# Patient Record
Sex: FEMALE | Race: WHITE | Employment: OTHER | URBAN - METROPOLITAN AREA
[De-identification: names, ages, dates, MRNs, and addresses within clinical notes are randomized per-mention and may not be internally consistent; named-entity substitution may affect disease eponyms.]

---

## 2024-10-07 ENCOUNTER — APPOINTMENT (OUTPATIENT)
Dept: GENERAL RADIOLOGY | Age: 59
End: 2024-10-07
Payer: MEDICARE

## 2024-10-07 ENCOUNTER — HOSPITAL ENCOUNTER (EMERGENCY)
Age: 59
Discharge: HOME | End: 2024-10-07
Attending: EMERGENCY MEDICINE
Payer: MEDICARE

## 2024-10-07 ENCOUNTER — APPOINTMENT (OUTPATIENT)
Dept: CT IMAGING | Age: 59
End: 2024-10-07
Payer: MEDICARE

## 2024-10-07 VITALS
HEART RATE: 76 BPM | DIASTOLIC BLOOD PRESSURE: 109 MMHG | SYSTOLIC BLOOD PRESSURE: 125 MMHG | RESPIRATION RATE: 20 BRPM | OXYGEN SATURATION: 97 % | TEMPERATURE: 97.9 F

## 2024-10-07 DIAGNOSIS — R11.2 NAUSEA AND VOMITING, UNSPECIFIED VOMITING TYPE: Primary | ICD-10-CM

## 2024-10-07 LAB
ANION GAP SERPL CALCULATED.3IONS-SCNC: 9 MMOL/L (ref 9–17)
BASOPHILS # BLD: 0 K/UL (ref 0–0.2)
BASOPHILS NFR BLD: 0 % (ref 0–2)
BUN SERPL-MCNC: 19 MG/DL (ref 6–20)
CALCIUM SERPL-MCNC: 11.2 MG/DL (ref 8.6–10.4)
CHLORIDE SERPL-SCNC: 104 MMOL/L (ref 98–107)
CO2 SERPL-SCNC: 31 MMOL/L (ref 20–31)
CREAT SERPL-MCNC: 0.8 MG/DL (ref 0.5–0.9)
EOSINOPHIL # BLD: 0.2 K/UL (ref 0–0.4)
EOSINOPHILS RELATIVE PERCENT: 2 % (ref 1–4)
ERYTHROCYTE [DISTWIDTH] IN BLOOD BY AUTOMATED COUNT: 14.2 % (ref 12.5–15.4)
GFR, ESTIMATED: 85 ML/MIN/1.73M2
GLUCOSE SERPL-MCNC: 130 MG/DL (ref 70–99)
HCT VFR BLD AUTO: 47 % (ref 36–46)
HGB BLD-MCNC: 15.3 G/DL (ref 12–16)
LACTATE BLDV-SCNC: 1.5 MMOL/L (ref 0.5–2.2)
LYMPHOCYTES NFR BLD: 1.7 K/UL (ref 1–4.8)
LYMPHOCYTES RELATIVE PERCENT: 14 % (ref 24–44)
MCH RBC QN AUTO: 30.7 PG (ref 26–34)
MCHC RBC AUTO-ENTMCNC: 32.6 G/DL (ref 31–37)
MCV RBC AUTO: 94.1 FL (ref 80–100)
MONOCYTES NFR BLD: 0.6 K/UL (ref 0.1–1.2)
MONOCYTES NFR BLD: 5 % (ref 2–11)
NEUTROPHILS NFR BLD: 79 % (ref 36–66)
NEUTS SEG NFR BLD: 9.9 K/UL (ref 1.8–7.7)
PLATELET # BLD AUTO: 247 K/UL (ref 140–450)
PMV BLD AUTO: 8.9 FL (ref 6–12)
POTASSIUM SERPL-SCNC: 4.6 MMOL/L (ref 3.7–5.3)
RBC # BLD AUTO: 4.99 M/UL (ref 4–5.2)
SODIUM SERPL-SCNC: 144 MMOL/L (ref 135–144)
WBC OTHER # BLD: 12.5 K/UL (ref 3.5–11)

## 2024-10-07 PROCEDURE — 71045 X-RAY EXAM CHEST 1 VIEW: CPT

## 2024-10-07 PROCEDURE — 74176 CT ABD & PELVIS W/O CONTRAST: CPT

## 2024-10-07 PROCEDURE — 80048 BASIC METABOLIC PNL TOTAL CA: CPT

## 2024-10-07 PROCEDURE — 99284 EMERGENCY DEPT VISIT MOD MDM: CPT

## 2024-10-07 PROCEDURE — 83605 ASSAY OF LACTIC ACID: CPT

## 2024-10-07 PROCEDURE — 85025 COMPLETE CBC W/AUTO DIFF WBC: CPT

## 2024-10-07 RX ORDER — CEPHALEXIN 500 MG/1
500 CAPSULE ORAL 2 TIMES DAILY
Qty: 14 CAPSULE | Refills: 0 | Status: SHIPPED | OUTPATIENT
Start: 2024-10-07 | End: 2024-10-14

## 2024-10-07 NOTE — ED PROVIDER NOTES
Residents, Physician Assistant/ Nurse Practitioner cases/documentation I have personally evaluated this patient and have completed at least one if not all key elements of the E/M (history, physical exam, and MDM). Additional findings are as noted.          (Please note that portions of this note were completed with a voice recognition program.  Efforts were made to edit the dictations but occasionally words are mis-transcribed.)    Salima Nicole DO  Attending Emergency Medicine Physician        Salima Nicole,   10/12/24 0731

## 2024-10-07 NOTE — ED PROVIDER NOTES
CURRENT MEDICATIONS       Previous Medications    ACETAMINOPHEN (TYLENOL) 160 MG/5ML LIQUID    Take 640 mg by mouth every 4 hours as needed for Fever    AMMONIUM LACTATE (AMLACTIN) 12 % CREAM    Apply topically 2 times daily .     BISACODYL (DULCOLAX) 10 MG SUPPOSITORY    Place 10 mg rectally every 72 hours as needed for Constipation     CALCIUM CITRATE (CALCITRATE PO)    Take 200 mg by mouth 2 times daily May crush    CARBAMAZEPINE (TEGRETOL) 200 MG TABLET    Take 200 mg by mouth 2 times daily May crush    CHLORHEXIDINE (PERIDEX) 0.12 % SOLUTION    Take 15 mLs by mouth 3 times daily POST MEALS      CLINDAMYCIN (CLINDAGEL) 1 % GEL    Apply 1 applicator topically daily as needed (FOREHEAD-ACNE) Apply topically 2 times daily.     DIAZEPAM (DIASTAT) 10 MG GEL    Place 10 mg rectally once as needed (may repeat in 4hrs prn)    DIAZEPAM (VALIUM) 5 MG TABLET    Take 5 mg by mouth daily as needed for Anxiety (, SEIZURE LASTING LONGER THEN 3 MINS OR 3 SEIZURES IN 1 HR)     HYDROCORTISONE VALERATE (WESTCORT EX)    Apply topically daily as needed (CHIN RASH)     LACTULOSE ENCEPHALOPATHY (GENERLAC) 10 GM/15ML SOLN SOLUTION    Take 10 g by mouth 2 times daily     LEVOTHYROXINE (SYNTHROID) 112 MCG TABLET    Take 112 mcg by mouth Daily May crush    LORATADINE (CLARITIN) 10 MG TABLET    Take 10 mg by mouth daily May crush    LORAZEPAM (ATIVAN) 1 MG TABLET    Take 1 mg by mouth daily as needed for Anxiety (1 hr prior to dental may repeat x1 prn)    MULTIPLE VITAMINS-MINERALS (CERTAVITE/ANTIOXIDANTS) LIQD    Take 5 mLs by mouth daily    MUPIROCIN (BACTROBAN) 2 % OINTMENT    Apply topically 2 times daily as needed IRRITATION  EXTERNAL EAR      NONFORMULARY    Take 15 mLs by mouth 2 times daily docu-liquid otc    NUTRITIONAL SUPPLEMENTS (BOOST BREEZE PO)    Take 4 oz by mouth 2 times daily With meds    OMEPRAZOLE (PRILOSEC) 20 MG CAPSULE    Take 20 mg by mouth daily    OXYGEN    Inhale 2 L into the lungs daily as needed

## 2024-10-07 NOTE — DISCHARGE INSTRUCTIONS
Keflex prophylactically with concern for a urinary tract infection today as evidenced by some inflammatory changes around the ureter on CAT scan.  We were unable to obtain a urine sample.  She does have a large kidney stone that will need follow-up with urology, Dr. Roberson.  Please follow-up in the next 1 to 2 days.  Return for vomiting, altered mentation, fevers, worsening symptoms, or any other concerns

## 2024-11-01 ENCOUNTER — TELEPHONE (OUTPATIENT)
Age: 59
End: 2024-11-01

## 2024-11-01 ENCOUNTER — HOSPITAL ENCOUNTER (OUTPATIENT)
Dept: ULTRASOUND IMAGING | Age: 59
Discharge: HOME | End: 2024-11-03
Attending: FAMILY MEDICINE
Payer: MEDICARE

## 2024-11-01 DIAGNOSIS — R19.09 GROIN MASS IN FEMALE: ICD-10-CM

## 2024-11-01 PROCEDURE — 76856 US EXAM PELVIC COMPLETE: CPT

## 2024-11-13 ENCOUNTER — OFFICE VISIT (OUTPATIENT)
Age: 59
End: 2024-11-13
Payer: MEDICARE

## 2024-11-13 VITALS — HEIGHT: 55 IN | BODY MASS INDEX: 18.28 KG/M2 | WEIGHT: 79 LBS

## 2024-11-13 DIAGNOSIS — N39.0 RECURRENT UTI: ICD-10-CM

## 2024-11-13 DIAGNOSIS — N20.0 STAGHORN CALCULUS: Primary | ICD-10-CM

## 2024-11-13 PROCEDURE — 1036F TOBACCO NON-USER: CPT | Performed by: SPECIALIST

## 2024-11-13 PROCEDURE — 99205 OFFICE O/P NEW HI 60 MIN: CPT | Performed by: SPECIALIST

## 2024-11-13 PROCEDURE — G8427 DOCREV CUR MEDS BY ELIG CLIN: HCPCS | Performed by: SPECIALIST

## 2024-11-13 PROCEDURE — G8484 FLU IMMUNIZE NO ADMIN: HCPCS | Performed by: SPECIALIST

## 2024-11-13 PROCEDURE — 3017F COLORECTAL CA SCREEN DOC REV: CPT | Performed by: SPECIALIST

## 2024-11-13 PROCEDURE — G8419 CALC BMI OUT NRM PARAM NOF/U: HCPCS | Performed by: SPECIALIST

## 2024-11-13 RX ORDER — ESOMEPRAZOLE MAGNESIUM 40 MG/1
GRANULE, DELAYED RELEASE ORAL
COMMUNITY
Start: 2024-11-01

## 2024-11-13 RX ORDER — LIDOCAINE HYDROCHLORIDE 10 MG/ML
INJECTION, SOLUTION EPIDURAL; INFILTRATION; INTRACAUDAL; PERINEURAL
COMMUNITY
Start: 2024-10-31

## 2024-11-13 RX ORDER — SENNOSIDES A AND B 8.6 MG/1
TABLET, FILM COATED ORAL
COMMUNITY
Start: 2024-08-07

## 2024-11-13 RX ORDER — LACTULOSE 10 G/15ML
SOLUTION ORAL
COMMUNITY
Start: 2024-11-01

## 2024-11-13 RX ORDER — GLYCOPYRROLATE 1 MG/1
TABLET ORAL
COMMUNITY
Start: 2024-11-01

## 2024-11-13 RX ORDER — MIDAZOLAM 5 MG/.1ML
SPRAY NASAL
COMMUNITY
Start: 2024-10-23

## 2024-11-13 RX ORDER — DENOSUMAB 60 MG/ML
INJECTION SUBCUTANEOUS
COMMUNITY

## 2024-11-13 RX ORDER — ONDANSETRON 4 MG/1
TABLET, FILM COATED ORAL
COMMUNITY
Start: 2024-10-31

## 2024-11-13 RX ORDER — POTASSIUM CHLORIDE 750 MG/1
TABLET, EXTENDED RELEASE ORAL
COMMUNITY
Start: 2024-11-01

## 2024-11-13 RX ORDER — CEFTRIAXONE 1 G/1
INJECTION, POWDER, FOR SOLUTION INTRAMUSCULAR; INTRAVENOUS
COMMUNITY
Start: 2024-10-31

## 2024-11-13 RX ORDER — LEVOTHYROXINE SODIUM 150 UG/1
TABLET ORAL
COMMUNITY
Start: 2024-11-01

## 2024-11-13 RX ORDER — PROBIOTIC PRODUCT - TAB 1B-250 MG
TAB ORAL
COMMUNITY
Start: 2024-11-01

## 2024-11-13 RX ORDER — DOCUSATE SODIUM 100 MG/1
TABLET ORAL
COMMUNITY
Start: 2024-11-01

## 2024-11-13 RX ORDER — IBUPROFEN 200 MG
CAPSULE ORAL
COMMUNITY
Start: 2024-11-01

## 2024-11-13 NOTE — PROGRESS NOTES
benign    Profound intellectual disability     NON VERBAL    Rhinitis     Thyroid disease     Weight loss     Wheelchair dependent     needs belt     Past Surgical History:   Procedure Laterality Date    UPPER GASTROINTESTINAL ENDOSCOPY  06/2016    with dil. at [facility]     No family history on file.  Social History     Tobacco Use   Smoking Status Never   Smokeless Tobacco Not on file      (If patient a smoker, smoking cessation counseling offered)   Social History     Substance and Sexual Activity   Alcohol Use No       ALLERGIES:  Pcn [penicillins] and Amoxicillin-pot clavulanate  MEDICATIONS:  Current Outpatient Medications   Medication Sig Dispense Refill    calcium citrate (CALCITRATE) 950 (200 Ca) MG tablet       cefTRIAXone (ROCEPHIN) 1 g injection        MG TABS       esomeprazole Magnesium (NEXIUM) 40 MG PACK       glycopyrrolate (ROBINUL) 1 MG tablet       CONSTULOSE 10 GM/15ML solution       lidocaine PF 1 % SOLN injection       NAYZILAM 5 MG/0.1ML SOLN       ondansetron (ZOFRAN) 4 MG tablet       potassium chloride (KLOR-CON M) 10 MEQ extended release tablet       Probiotic Product (KENNEDY-BID PROBIOTIC) TABS       senna (SENOKOT) 8.6 MG tablet       levothyroxine (SYNTHROID) 150 MCG tablet       PROLIA 60 MG/ML SOSY SC injection INJECT 1 SYRINGE SUBCUTANEOUSLY EVERY 6 MONTHS (OSTEOPOROSIS) -BY NURSE *DUE OCTOBER 2021      polyethylene glycol (GLYCOLAX) powder Take 17 g by mouth as needed      Calcium Citrate (CALCITRATE PO) Take 200 mg by mouth 2 times daily May crush      carBAMazepine (TEGRETOL) 200 MG tablet Take 200 mg by mouth 2 times daily May crush      Multiple Vitamins-Minerals (CERTAVITE/ANTIOXIDANTS) LIQD Take 5 mLs by mouth daily      diazepam (VALIUM) 5 MG tablet Take 5 mg by mouth daily as needed for Anxiety (, SEIZURE LASTING LONGER THEN 3 MINS OR 3 SEIZURES IN 1 HR)       NONFORMULARY Take 15 mLs by mouth 2 times daily docu-liquid otc      polyethylene glycol (GLYCOLAX) packet

## 2024-11-18 DIAGNOSIS — N20.0 STAGHORN CALCULUS: Primary | ICD-10-CM

## 2024-11-18 DIAGNOSIS — Z01.818 PRE-OP EVALUATION: Primary | ICD-10-CM

## 2024-11-18 PROCEDURE — 93000 ELECTROCARDIOGRAM COMPLETE: CPT | Performed by: SPECIALIST

## 2024-11-25 ENCOUNTER — TELEPHONE (OUTPATIENT)
Age: 59
End: 2024-11-25

## 2024-11-25 NOTE — TELEPHONE ENCOUNTER
----- Message from Dr. Juno Solorzano MD sent at 11/25/2024  8:01 AM EST -----  Keep patient on Bactrim DS 1 po qd once she finishes course of Macrobid ordered by her PCP.

## 2024-12-05 ENCOUNTER — HOSPITAL ENCOUNTER (OUTPATIENT)
Facility: CLINIC | Age: 59
Discharge: HOME | End: 2024-12-05

## 2024-12-08 LAB
EKG ATRIAL RATE: 86 BPM
EKG P AXIS: 63 DEGREES
EKG P-R INTERVAL: 116 MS
EKG Q-T INTERVAL: 362 MS
EKG QRS DURATION: 58 MS
EKG QTC CALCULATION (BAZETT): 464 MS
EKG R AXIS: -156 DEGREES
EKG T AXIS: 64 DEGREES
EKG VENTRICULAR RATE: 99 BPM

## 2024-12-11 ENCOUNTER — TELEPHONE (OUTPATIENT)
Age: 59
End: 2024-12-11

## 2024-12-11 NOTE — TELEPHONE ENCOUNTER
Erica, caregiver/nurse, called for patient and would like surgery/procedure consent form(s) faxed over to her  Fax# [phone]

## 2024-12-16 ENCOUNTER — APPOINTMENT (OUTPATIENT)
Dept: GENERAL RADIOLOGY | Age: 59
DRG: 194 | End: 2024-12-16
Payer: MEDICARE

## 2024-12-16 ENCOUNTER — HOSPITAL ENCOUNTER (INPATIENT)
Age: 59
LOS: 1 days | Discharge: NURSING, REHAB OR LONG-TERM CARE | DRG: 194 | End: 2024-12-17
Attending: EMERGENCY MEDICINE | Admitting: FAMILY MEDICINE
Payer: MEDICARE

## 2024-12-16 DIAGNOSIS — J18.9 PNEUMONIA OF LEFT LUNG DUE TO INFECTIOUS ORGANISM, UNSPECIFIED PART OF LUNG: Primary | ICD-10-CM

## 2024-12-16 DIAGNOSIS — R09.02 HYPOXEMIA: ICD-10-CM

## 2024-12-16 LAB
ALBUMIN SERPL-MCNC: 3.6 G/DL (ref 3.5–5.2)
ALBUMIN/GLOB SERPL: 0.9 {RATIO} (ref 1–2.5)
ALLEN TEST: NORMAL
ALP SERPL-CCNC: 114 U/L (ref 35–104)
ALT SERPL-CCNC: 24 U/L (ref 5–33)
ANION GAP SERPL CALCULATED.3IONS-SCNC: 11 MMOL/L (ref 9–17)
AST SERPL-CCNC: 26 U/L
BASOPHILS # BLD: 0 K/UL (ref 0–0.2)
BASOPHILS NFR BLD: 0 % (ref 0–2)
BILIRUB SERPL-MCNC: 0.2 MG/DL (ref 0.3–1.2)
BNP SERPL-MCNC: <36 PG/ML
BUN SERPL-MCNC: 20 MG/DL (ref 6–20)
CALCIUM SERPL-MCNC: 9.5 MG/DL (ref 8.6–10.4)
CHLORIDE SERPL-SCNC: 108 MMOL/L (ref 98–107)
CO2 SERPL-SCNC: 24 MMOL/L (ref 20–31)
CREAT SERPL-MCNC: 0.8 MG/DL (ref 0.5–0.9)
EKG ATRIAL RATE: 117 BPM
EKG P AXIS: 70 DEGREES
EKG P-R INTERVAL: 156 MS
EKG Q-T INTERVAL: 312 MS
EKG QRS DURATION: 52 MS
EKG QTC CALCULATION (BAZETT): 435 MS
EKG R AXIS: -151 DEGREES
EKG T AXIS: 57 DEGREES
EKG VENTRICULAR RATE: 117 BPM
EOSINOPHIL # BLD: 0.1 K/UL (ref 0–0.4)
EOSINOPHILS RELATIVE PERCENT: 2 % (ref 1–4)
ERYTHROCYTE [DISTWIDTH] IN BLOOD BY AUTOMATED COUNT: 12.7 % (ref 12.5–15.4)
FIO2: 15
FLUAV AG SPEC QL: NEGATIVE
FLUBV AG SPEC QL: NEGATIVE
GFR, ESTIMATED: 85 ML/MIN/1.73M2
GLUCOSE SERPL-MCNC: 140 MG/DL (ref 70–99)
HCO3 VENOUS: 28.2 MMOL/L (ref 22–29)
HCT VFR BLD AUTO: 48.2 % (ref 36–46)
HGB BLD-MCNC: 15.8 G/DL (ref 12–16)
LACTATE BLDV-SCNC: 1.9 MMOL/L (ref 0.5–2.2)
LYMPHOCYTES NFR BLD: 1 K/UL (ref 1–4.8)
LYMPHOCYTES RELATIVE PERCENT: 17 % (ref 24–44)
MAGNESIUM SERPL-MCNC: 2.3 MG/DL (ref 1.6–2.6)
MCH RBC QN AUTO: 30.9 PG (ref 26–34)
MCHC RBC AUTO-ENTMCNC: 32.9 G/DL (ref 31–37)
MCV RBC AUTO: 94 FL (ref 80–100)
MONOCYTES NFR BLD: 0.2 K/UL (ref 0.1–1.2)
MONOCYTES NFR BLD: 4 % (ref 2–11)
NEUTROPHILS NFR BLD: 77 % (ref 36–66)
NEUTS SEG NFR BLD: 4.9 K/UL (ref 1.8–7.7)
O2 DELIVERY DEVICE: NORMAL
O2 SAT, VEN: 64.5 % (ref 60–85)
PCO2 VENOUS: 48.4 MM HG (ref 41–51)
PH VENOUS: 7.37 (ref 7.32–7.43)
PLATELET # BLD AUTO: 263 K/UL (ref 140–450)
PMV BLD AUTO: 9.8 FL (ref 6–12)
PO2 VENOUS: 34.8 MM HG (ref 30–50)
POSITIVE BASE EXCESS, VEN: 1.9 MMOL/L (ref 0–3)
POTASSIUM SERPL-SCNC: 4.2 MMOL/L (ref 3.7–5.3)
PROT SERPL-MCNC: 7.7 G/DL (ref 6.4–8.3)
RBC # BLD AUTO: 5.13 M/UL (ref 4–5.2)
SARS-COV-2 RDRP RESP QL NAA+PROBE: NOT DETECTED
SODIUM SERPL-SCNC: 143 MMOL/L (ref 135–144)
SPECIMEN DESCRIPTION: NORMAL
WBC OTHER # BLD: 6.3 K/UL (ref 3.5–11)

## 2024-12-16 PROCEDURE — 83880 ASSAY OF NATRIURETIC PEPTIDE: CPT

## 2024-12-16 PROCEDURE — 99285 EMERGENCY DEPT VISIT HI MDM: CPT

## 2024-12-16 PROCEDURE — 83735 ASSAY OF MAGNESIUM: CPT

## 2024-12-16 PROCEDURE — 2060000000 HC ICU INTERMEDIATE R&B

## 2024-12-16 PROCEDURE — 82803 BLOOD GASES ANY COMBINATION: CPT

## 2024-12-16 PROCEDURE — 85025 COMPLETE CBC W/AUTO DIFF WBC: CPT

## 2024-12-16 PROCEDURE — 2580000003 HC RX 258: Performed by: EMERGENCY MEDICINE

## 2024-12-16 PROCEDURE — 87804 INFLUENZA ASSAY W/OPTIC: CPT

## 2024-12-16 PROCEDURE — 71045 X-RAY EXAM CHEST 1 VIEW: CPT

## 2024-12-16 PROCEDURE — 6360000002 HC RX W HCPCS: Performed by: EMERGENCY MEDICINE

## 2024-12-16 PROCEDURE — 87635 SARS-COV-2 COVID-19 AMP PRB: CPT

## 2024-12-16 PROCEDURE — 94761 N-INVAS EAR/PLS OXIMETRY MLT: CPT

## 2024-12-16 PROCEDURE — 93005 ELECTROCARDIOGRAM TRACING: CPT | Performed by: EMERGENCY MEDICINE

## 2024-12-16 PROCEDURE — 87040 BLOOD CULTURE FOR BACTERIA: CPT

## 2024-12-16 PROCEDURE — 80053 COMPREHEN METABOLIC PANEL: CPT

## 2024-12-16 PROCEDURE — 83605 ASSAY OF LACTIC ACID: CPT

## 2024-12-16 PROCEDURE — 2580000003 HC RX 258: Performed by: FAMILY MEDICINE

## 2024-12-16 PROCEDURE — 2700000000 HC OXYGEN THERAPY PER DAY

## 2024-12-16 PROCEDURE — 36415 COLL VENOUS BLD VENIPUNCTURE: CPT

## 2024-12-16 PROCEDURE — 6360000002 HC RX W HCPCS: Performed by: FAMILY MEDICINE

## 2024-12-16 PROCEDURE — 93010 ELECTROCARDIOGRAM REPORT: CPT | Performed by: INTERNAL MEDICINE

## 2024-12-16 RX ORDER — SODIUM CHLORIDE 0.9 % (FLUSH) 0.9 %
5-40 SYRINGE (ML) INJECTION EVERY 12 HOURS SCHEDULED
Status: DISCONTINUED | OUTPATIENT
Start: 2024-12-16 | End: 2024-12-17 | Stop reason: HOSPADM

## 2024-12-16 RX ORDER — POTASSIUM CHLORIDE 750 MG/1
10 CAPSULE, EXTENDED RELEASE ORAL DAILY
Status: DISCONTINUED | OUTPATIENT
Start: 2024-12-16 | End: 2024-12-17 | Stop reason: HOSPADM

## 2024-12-16 RX ORDER — ONDANSETRON 2 MG/ML
4 INJECTION INTRAMUSCULAR; INTRAVENOUS EVERY 6 HOURS PRN
Status: DISCONTINUED | OUTPATIENT
Start: 2024-12-16 | End: 2024-12-17 | Stop reason: HOSPADM

## 2024-12-16 RX ORDER — ACETAMINOPHEN 650 MG/1
650 SUPPOSITORY RECTAL EVERY 6 HOURS PRN
Status: DISCONTINUED | OUTPATIENT
Start: 2024-12-16 | End: 2024-12-17 | Stop reason: HOSPADM

## 2024-12-16 RX ORDER — CLINDAMYCIN PHOSPHATE 10 MG/G
1 GEL TOPICAL DAILY PRN
Status: DISCONTINUED | OUTPATIENT
Start: 2024-12-16 | End: 2024-12-17 | Stop reason: HOSPADM

## 2024-12-16 RX ORDER — PAROXETINE 20 MG/1
20 TABLET, FILM COATED ORAL EVERY MORNING
Status: DISCONTINUED | OUTPATIENT
Start: 2024-12-16 | End: 2024-12-17 | Stop reason: HOSPADM

## 2024-12-16 RX ORDER — ONDANSETRON 4 MG/1
4 TABLET, ORALLY DISINTEGRATING ORAL EVERY 8 HOURS PRN
Status: DISCONTINUED | OUTPATIENT
Start: 2024-12-16 | End: 2024-12-17 | Stop reason: HOSPADM

## 2024-12-16 RX ORDER — DIAZEPAM 5 MG/1
5 TABLET ORAL DAILY PRN
Status: DISCONTINUED | OUTPATIENT
Start: 2024-12-16 | End: 2024-12-17 | Stop reason: HOSPADM

## 2024-12-16 RX ORDER — BISACODYL 10 MG
10 SUPPOSITORY, RECTAL RECTAL
Status: DISCONTINUED | OUTPATIENT
Start: 2024-12-16 | End: 2024-12-17 | Stop reason: HOSPADM

## 2024-12-16 RX ORDER — ACETAMINOPHEN 160 MG/5ML
650 LIQUID ORAL EVERY 4 HOURS PRN
Status: DISCONTINUED | OUTPATIENT
Start: 2024-12-16 | End: 2024-12-17 | Stop reason: HOSPADM

## 2024-12-16 RX ORDER — DIAZEPAM 10 MG/2G
10 GEL RECTAL
Status: ACTIVE | OUTPATIENT
Start: 2024-12-16 | End: 2024-12-17

## 2024-12-16 RX ORDER — POTASSIUM CHLORIDE 7.45 MG/ML
10 INJECTION INTRAVENOUS PRN
Status: DISCONTINUED | OUTPATIENT
Start: 2024-12-16 | End: 2024-12-17 | Stop reason: HOSPADM

## 2024-12-16 RX ORDER — LEVOTHYROXINE SODIUM 112 UG/1
112 TABLET ORAL DAILY
Status: DISCONTINUED | OUTPATIENT
Start: 2024-12-16 | End: 2024-12-17 | Stop reason: HOSPADM

## 2024-12-16 RX ORDER — MAGNESIUM SULFATE IN WATER 40 MG/ML
2000 INJECTION, SOLUTION INTRAVENOUS PRN
Status: DISCONTINUED | OUTPATIENT
Start: 2024-12-16 | End: 2024-12-17 | Stop reason: HOSPADM

## 2024-12-16 RX ORDER — CHLORHEXIDINE GLUCONATE ORAL RINSE 1.2 MG/ML
15 SOLUTION DENTAL 3 TIMES DAILY
Status: DISCONTINUED | OUTPATIENT
Start: 2024-12-16 | End: 2024-12-17 | Stop reason: HOSPADM

## 2024-12-16 RX ORDER — DOCUSATE SODIUM 100 MG/1
100 CAPSULE, LIQUID FILLED ORAL NIGHTLY
COMMUNITY

## 2024-12-16 RX ORDER — SODIUM CHLORIDE 9 MG/ML
INJECTION, SOLUTION INTRAVENOUS PRN
Status: DISCONTINUED | OUTPATIENT
Start: 2024-12-16 | End: 2024-12-17 | Stop reason: HOSPADM

## 2024-12-16 RX ORDER — SODIUM CHLORIDE 0.9 % (FLUSH) 0.9 %
5-40 SYRINGE (ML) INJECTION PRN
Status: DISCONTINUED | OUTPATIENT
Start: 2024-12-16 | End: 2024-12-17 | Stop reason: HOSPADM

## 2024-12-16 RX ORDER — LACTULOSE 10 G/15ML
10 SOLUTION ORAL 2 TIMES DAILY
Status: DISCONTINUED | OUTPATIENT
Start: 2024-12-16 | End: 2024-12-17 | Stop reason: HOSPADM

## 2024-12-16 RX ORDER — ESOMEPRAZOLE MAGNESIUM 40 MG/1
40 GRANULE, DELAYED RELEASE ORAL DAILY
Status: DISCONTINUED | OUTPATIENT
Start: 2024-12-16 | End: 2024-12-16 | Stop reason: CLARIF

## 2024-12-16 RX ORDER — POLYETHYLENE GLYCOL 3350 17 G/17G
17 POWDER, FOR SOLUTION ORAL DAILY PRN
Status: DISCONTINUED | OUTPATIENT
Start: 2024-12-16 | End: 2024-12-17 | Stop reason: HOSPADM

## 2024-12-16 RX ORDER — CARBAMAZEPINE 200 MG/1
200 TABLET ORAL 2 TIMES DAILY
Status: DISCONTINUED | OUTPATIENT
Start: 2024-12-16 | End: 2024-12-17 | Stop reason: HOSPADM

## 2024-12-16 RX ORDER — POTASSIUM CHLORIDE 1500 MG/1
40 TABLET, EXTENDED RELEASE ORAL PRN
Status: DISCONTINUED | OUTPATIENT
Start: 2024-12-16 | End: 2024-12-17 | Stop reason: HOSPADM

## 2024-12-16 RX ORDER — ACETAMINOPHEN 325 MG/1
650 TABLET ORAL EVERY 6 HOURS PRN
Status: DISCONTINUED | OUTPATIENT
Start: 2024-12-16 | End: 2024-12-17 | Stop reason: HOSPADM

## 2024-12-16 RX ORDER — ENOXAPARIN SODIUM 100 MG/ML
30 INJECTION SUBCUTANEOUS DAILY
Status: DISCONTINUED | OUTPATIENT
Start: 2024-12-16 | End: 2024-12-17 | Stop reason: HOSPADM

## 2024-12-16 RX ORDER — IPRATROPIUM BROMIDE AND ALBUTEROL SULFATE 2.5; .5 MG/3ML; MG/3ML
1 SOLUTION RESPIRATORY (INHALATION)
Status: ACTIVE | OUTPATIENT
Start: 2024-12-16 | End: 2024-12-16

## 2024-12-16 RX ADMIN — ENOXAPARIN SODIUM 30 MG: 100 INJECTION SUBCUTANEOUS at 09:13

## 2024-12-16 RX ADMIN — WATER 1000 MG: 1 INJECTION INTRAMUSCULAR; INTRAVENOUS; SUBCUTANEOUS at 09:13

## 2024-12-16 RX ADMIN — AZITHROMYCIN MONOHYDRATE 500 MG: 500 INJECTION, POWDER, LYOPHILIZED, FOR SOLUTION INTRAVENOUS at 09:20

## 2024-12-16 RX ADMIN — SODIUM CHLORIDE, PRESERVATIVE FREE 10 ML: 5 INJECTION INTRAVENOUS at 21:00

## 2024-12-16 NOTE — DISCHARGE INSTR - COC
Documentation and Therapy:        Elimination:  Continence:   Bowel: No  Bladder: No  Urinary Catheter: None   Colostomy/Ileostomy/Ileal Conduit: No       Date of Last BM: Unknown  No intake or output data in the 24 hours ending 12/16/24 1313  No intake/output data recorded.    Safety Concerns:     Aspiration Risk    Impairments/Disabilities:      Non-verbal/intellectual disability     Nutrition Therapy:  Current Nutrition Therapy:   - Tube feed at baseline    Routes of Feeding: Gastrostomy Tube  Liquids: NPO  Daily Fluid Restriction: no  Last Modified Barium Swallow with Video (Video Swallowing Test): not done    Treatments at the Time of Hospital Discharge:   Respiratory Treatments: See med orders  Oxygen Therapy:   15L per Non-rebreather  Ventilator:    - No ventilator support    Rehab Therapies: n/a  Weight Bearing Status/Restrictions: No weight bearing restrictions  Other Medical Equipment (for information only, NOT a DME order):  wheelchair and hospital bed  Other Treatments: hospice    Patient's personal belongings (please select all that are sent with patient):  None    RN SIGNATURE:  Electronically signed by Kaitlynn Fitch RN on 12/16/24 at 1:55 PM EST    CASE MANAGEMENT/SOCIAL WORK SECTION    Inpatient Status Date: 12/16/2024    Readmission Risk Assessment Score:  [facility] RISK OF UNPLANNED READMISSION 2.0             13.5 Total Score        Discharging to Facility/ Agency   [facility]    / signature: Electronically signed by Jane Ovalle on 12/16/24 at 3:56 PM EST    PHYSICIAN SECTION    Prognosis: Fair    Condition at Discharge: Unstable    Rehab Potential (if transferring to Rehab): Guarded    Recommended Labs or Other Treatments After Discharge: Referral to hospice    Physician Certification: I certify the above information and transfer of Gabbie Petty  is necessary for the continuing treatment of the diagnosis listed and that she requires Hospice for greater 30 days. 
Cold/Sinus

## 2024-12-16 NOTE — ED NOTES
ED to inpatient nurses report      Chief Complaint:  Chief Complaint   Patient presents with   • OTHER     Respiratory concerns per facility- patient non verbal      Present to ED from: [facility]    MOA:     LOC: Alert to name only.   Mobility: Fully dependent  Oxygen Baseline: 2L via N/C    Current needs required: 10L non-rebreather   Pending ED orders: None  Present condition: Patient is alert to name only, cognitive deficits noted but not a specified diagnosis in paperwork from NH. Patient is non ambulatory and NH uses lift assist with moving patient. Patient does moan out a lot and is non-verbal at baseline.     Why did the patient come to the ED? [facility] sent patient in due to low O2 sats, they wanted her evaluated for pneumonia.   What is the plan? Admit for pneumonia and hypoxemia.   Any procedures or intervention occur? Xr chest.   Any safety concerns??Fall risk.   CODE STATUS DNR-CC  Diet Diet NPO    Mental Status:       Psych Assessment:   Psychosocial  Psychosocial (WDL):  (Patient is unable to provide any appropriate detail due to cogitive deficits.)  Vital signs   Vitals:    12/16/24 0642   BP: 93/77   Pulse: (!) 105   Resp: 22   SpO2: (!) 80%   Weight: 35.8 kg (79 lb)   Height: 1.397 m (4' 7\")        Vitals:  Patient Vitals for the past 24 hrs:   BP Pulse Resp SpO2 Height Weight   12/16/24 0642 93/77 (!) 105 22 (!) 80 % 1.397 m (4' 7\") 35.8 kg (79 lb)      Visit Vitals  BP 93/77   Pulse (!) 105   Resp 22   Ht 1.397 m (4' 7\")   Wt 35.8 kg (79 lb)   SpO2 (!) 80%   BMI 18.36 kg/m²        LDAs:   Peripheral IV 12/16/24 Distal;Posterior;Right Forearm (Active)   Site Assessment Clean, dry & intact;Clean;Dry;Intact 12/16/24 0715   Line Status Blood return noted;Brisk blood return;Capped 12/16/24 0715   Line Care Connections checked and tightened 12/16/24 0715   Phlebitis Assessment No symptoms 12/16/24 0715   Infiltration Assessment 0 12/16/24 0715   Alcohol Cap Used Yes 12/16/24 0715   Dressing Status 
Writer talked with Alyssa nurse at [facility] regarding expectations for treatment as patient is a DNR-CC. Nurse states that they had a concern for pneumonia and wanted her evaluated for that to see if she needed any form of medication treatment. Nurse Alyssa also stated that patients Mother is aware of transfer to [facility] ED. ER Attending updated on conversation with staff at [facility].   
MONTHS (OSTEOPOROSIS) -BY NURSE *DUE OCTOBER 2021    SENNA (SENOKOT) 8.6 MG TABLET        SODIUM PHOSPHATES (FLEET) 7-19 GM/118ML    Place 1 enema rectally once as needed    TOBRAMYCIN-DEXAMETHASONE (TOBRADEX) 0.3-0.1 % OPHTHALMIC OINTMENT    2 times daily as needed     VITAMIN D3 (COLECALCIFEROL) 400 UNITS TABS TABLET    Take 400 Units by mouth daily May crush     Orders Placed This Encounter   Medications    ipratropium 0.5 mg-albuterol 2.5 mg (DUONEB) nebulizer solution 1 Dose     Order Specific Question:   Initiate RT Bronchodilator Protocol     Answer:   No    azithromycin (ZITHROMAX) 500 mg in sodium chloride 0.9 % 250 mL IVPB ([id number])     Order Specific Question:   Antimicrobial Indications     Answer:   Pneumonia (CAP)    cefTRIAXone (ROCEPHIN) 1,000 mg in sterile water 10 mL IV syringe     Order Specific Question:   Antimicrobial Indications     Answer:   Pneumonia (CAP)    sodium chloride flush 0.9 % injection 5-40 mL    sodium chloride flush 0.9 % injection 5-40 mL    0.9 % sodium chloride infusion    OR Linked Order Group     potassium chloride (KLOR-CON M) extended release tablet 40 mEq     potassium bicarb-citric acid (EFFER-K) effervescent tablet 40 mEq     potassium chloride 10 mEq/100 mL IVPB (Peripheral Line)    magnesium sulfate 2000 mg in 50 mL IVPB premix    enoxaparin Sodium (LOVENOX) injection 30 mg     Order Specific Question:   Indication of Use     Answer:   Prophylaxis-DVT/PE    OR Linked Order Group     ondansetron (ZOFRAN-ODT) disintegrating tablet 4 mg     ondansetron (ZOFRAN) injection 4 mg    polyethylene glycol (GLYCOLAX) packet 17 g    OR Linked Order Group     acetaminophen (TYLENOL) tablet 650 mg     acetaminophen (TYLENOL) suppository 650 mg       SURGICAL HISTORY       Past Surgical History:   Procedure Laterality Date    UPPER GASTROINTESTINAL ENDOSCOPY  06/2016    with dil. at [facility]       PAST MEDICAL HISTORY       Past Medical History:   Diagnosis Date    Abdominal pain

## 2024-12-16 NOTE — DISCHARGE SUMMARY
[facility]  IN-PATIENT SERVICE   [facility]    Discharge Summary     Patient ID: Gabbie Petty  :  1965   MRN: [id number]     ACCOUNT:  [id number]   Patient's PCP: Falguni Lorenzo MD  Admit Date: 2024   Discharge Date: 2024  Length of Stay: 0  Code Status:  DNR-CC  Admitting Physician: Falguni Lorenzo MD  Discharge Physician: Falguni Lorenzo MD     Active Discharge Diagnoses:     Hospital Problem Lists:  Principal Problem:    Facility-acquired pneumonia  Resolved Problems:    * No resolved hospital problems. *      Admission Condition:  fair     Discharged Condition: fair    Hospital Stay:     Hospital Course:  Gabbie Petty is a 59 y.o. female who was admitted for the management of Facility-acquired pneumonia , presented to ER with OTHER (Respiratory concerns per facility- patient non verbal )    Patient is from [facility]. She is nonverbal. She was found to have a large hiatal hernia and a staghorn renal stone in 2024 when she was hospitalized for sepsis and UTI. She then had hypoxia and pneumonia two weeks ago for which she was sent to [facility]. Due to the fact that she was DNR-CC, she was started on oxygen and sent back to [facility]. She had to remain on oxygen 2L since being discharged back but then became hypoxic last evening, satting into the 70s despite increasing oxygen up to 6L. She was taken by EMS to ER where a CXR showed new left side opacities concerning for pneumonia. She was started on rocephin and azithromycin and has continued to require oxygen to maintain saturation.     Discussed with mom that unfortunately with large hiatal hernia, patient will continue to have pneumonia and hypoxia.  Mom agreed to start hospice with goal of providing comfort care.     Significant therapeutic interventions: oxygen    Significant Diagnostic Studies:   Labs:  Hematology:  Recent Labs     24  0709   WBC 6.3   RBC 5.13   HGB

## 2024-12-16 NOTE — ED PROVIDER NOTES
ATTENDING  ADDENDUM     Care of this patient was assumed from Dr. Nicole.  The patient was seen for OTHER (Respiratory concerns per facility- patient non verbal )  .  The patient's initial evaluation and plan have been discussed with the prior provider who initially evaluated the patient.  Nursing Notes, Past Medical Hx, Past Surgical Hx, Social Hx, Allergies, and Family Hx were all reviewed.      DIAGNOSTIC RESULTS     EKG:  EKG is poor quality but is interpreted by myself showing sinus tachycardia with some supraventricular complexes.  There is the patient may have right ventricular hypertrophy.  Rate is 117.  Axis and intervals are otherwise normal.  I do not appreciate acute ST segment changes.    RADIOLOGY:   XR CHEST PORTABLE    Result Date: 12/16/2024  EXAMINATION: ONE XRAY VIEW OF THE CHEST 12/16/2024 7:41 am COMPARISON: 10/07/2024 HISTORY: ORDERING SYSTEM PROVIDED HISTORY: Shortness of Breath TECHNOLOGIST PROVIDED HISTORY: Shortness of Breath Reason for Exam: SOB FINDINGS: Lines and tubes: None Stable cardiomegaly.  New left perihilar and left lower lobe airspace opacities could relate to pneumonia.  The right lung is clear.  No pneumothorax.     New left-sided airspace opacities concerning for pneumonia.     Echo complete W/O contrast    Result Date: 12/13/2024  Technically difficult study The left ventricle appears normal in size, normal LV wall thickness, normal LV wall motions Normal LV systolic function, ejection fraction 60-65% Grade 1 LV diastolic dysfunction The right ventricle appears normal in size and function No obvious significant valvular abnormality noted Normal aortic root dimensions The IVC not well visualized No significant pericardial effusion seen Left Ventricle Left ventricle is small. Wall thickness is normal. Systolic function is normal with an ejection fraction of 60-65%. No segmental wall motion abnormalities. Normal diastolic function is present. Lateral E' is 6.20 cm/s. 
(AMLACTIN) 12 % CREAM    Apply topically 2 times daily .     BISACODYL (DULCOLAX) 10 MG SUPPOSITORY    Place 10 mg rectally every 72 hours as needed for Constipation     CALCIUM CITRATE (CALCITRATE PO)    Take 200 mg by mouth 2 times daily May crush    CALCIUM CITRATE (CALCITRATE) 950 (200 CA) MG TABLET        CARBAMAZEPINE (TEGRETOL) 200 MG TABLET    Take 200 mg by mouth 2 times daily May crush    CEFTRIAXONE (ROCEPHIN) 1 G INJECTION        CHLORHEXIDINE (PERIDEX) 0.12 % SOLUTION    Take 15 mLs by mouth 3 times daily POST MEALS      CLINDAMYCIN (CLINDAGEL) 1 % GEL    Apply 1 applicator topically daily as needed (FOREHEAD-ACNE) Apply topically 2 times daily.     CONSTULOSE 10 GM/15ML SOLUTION        DIAZEPAM (DIASTAT) 10 MG GEL    Place 10 mg rectally once as needed (may repeat in 4hrs prn)    DIAZEPAM (VALIUM) 5 MG TABLET    Take 5 mg by mouth daily as needed for Anxiety (, SEIZURE LASTING LONGER THEN 3 MINS OR 3 SEIZURES IN 1 HR)      MG TABS        ESOMEPRAZOLE MAGNESIUM (NEXIUM) 40 MG PACK        GLYCOPYRROLATE (ROBINUL) 1 MG TABLET        HYDROCORTISONE VALERATE (WESTCORT EX)    Apply topically daily as needed (CHIN RASH)     LACTULOSE ENCEPHALOPATHY (GENERLAC) 10 GM/15ML SOLN SOLUTION    Take 10 g by mouth 2 times daily     LEVOTHYROXINE (SYNTHROID) 112 MCG TABLET    Take 112 mcg by mouth Daily May crush    LEVOTHYROXINE (SYNTHROID) 150 MCG TABLET        LIDOCAINE PF 1 % SOLN INJECTION        LORATADINE (CLARITIN) 10 MG TABLET    Take 10 mg by mouth daily May crush    LORAZEPAM (ATIVAN) 1 MG TABLET    Take 1 mg by mouth daily as needed for Anxiety (1 hr prior to dental may repeat x1 prn)    MULTIPLE VITAMINS-MINERALS (CERTAVITE/ANTIOXIDANTS) LIQD    Take 5 mLs by mouth daily    MUPIROCIN (BACTROBAN) 2 % OINTMENT    Apply topically 2 times daily as needed IRRITATION  EXTERNAL EAR      NAYZILAM 5 MG/0.1ML SOLN        NONFORMULARY    Take 15 mLs by mouth 2 times daily docu-liquid otc    NUTRITIONAL

## 2024-12-16 NOTE — ED TRIAGE NOTES
Patient brought in via EMS from [facility] for oxygenation concerns. Patient is non verbal. No report from facility prior to ED arrival. Patient is not in obvious respiratory distress upon arrival.

## 2024-12-16 NOTE — PROGRESS NOTES
Spiritual Health History and Assessment/Progress Note  [facility]    (P) Initial Encounter,  ,  ,      Name: Gabbie Petty MRN: [id number]    Age: 59 y.o.     Sex: female   Language: English   Anglican: Other   Facility-acquired pneumonia     Date: 12/16/2024            Total Time Calculated: (P) 8 min              Spiritual Assessment began in [facility]        Referral/Consult From: (P) Rounding   Encounter Overview/Reason: (P) Initial Encounter  Service Provided For: (P) Patient and family together    Writer met w/ pt and pt's mother and brother in pt's room. Pt was unable to speak with writer. Pt's mother and brother appeared to be very supportive of pt and one another. Pt and brother appeared to be calm and coping.     Maribell, Belief, Meaning:   Patient unable to assess at this time  Family/Friends Other: unable to assess      Importance and Influence:  Patient unable to assess at this time  Family/Friends Other: unable to assess    Community:  Patient Other:    Family/Friends feel well-supported. Support system includes: Parent/s and Extended family    Assessment and Plan of Care:     Patient Interventions include: Other:    Family/Friends Interventions include: Facilitated expression of thoughts and feelings    Patient Plan of Care: Spiritual Care available upon further referral  Family/Friends Plan of Care: Spiritual Care available upon further referral    Electronically signed by Chaplain Nedra on 12/16/2024 at 2:27 PM

## 2024-12-16 NOTE — H&P
[facility]   IN-PATIENT [facility]    HISTORY AND PHYSICAL EXAMINATION            Date:   12/16/2024  Patient name:  Gabbie Petty  Date of admission:  12/16/2024  6:50 AM  MRN:   [id number]  Account:  [id number]  YOB: 1965  PCP:    Falguni Lorenzo MD  Room:   [address] [place] [address]  Code Status:    DNR-CC      History Obtained From:     electronic medical record    History of Present Illness:     Gabbie Petty is a 59 y.o.  /  female who presents with OTHER (Respiratory concerns per facility- patient non verbal )   and is admitted to the hospital for the management of Facility-acquired pneumonia.    Patient is from [facility].  She is nonverbal.  She was found to have a large hiatal hernia and a staghorn renal stone in October 2024 when she was hospitalized for sepsis and UTI.  She then had hypoxia and pneumonia two weeks ago for which she was sent to [facility].  Due to the fact that she was DNR-CC, she was started on oxygen and sent back to [facility].  She had to remain on oxygen 2L since being discharged back but then became hypoxic last evening, satting into the 70s despite increasing oxygen up to 6L.  She was taken by EMS to ER where a CXR showed new left side opacities concerning for pneumonia.  She was started on rocephin and azithromycin and has continued to require oxygen to maintain saturation.      Past Medical History:     Past Medical History:   Diagnosis Date    Abdominal pain     Constipation     Contact dermatitis     CP (cerebral palsy), spastic ([facility])     Dysthymia     Epilepsy ([facility])     LAST SEIZURE 2012    Esophageal stricture     has been on liquid diet     Gait difficulty     AMBULATES WITH ASSIST AND WALKER ONLY    GERD (gastroesophageal reflux disease)     Hypothyroid     Incontinence of urine     WEARS BRIEF    Incontinent of feces     WEARS BRIEF    Microcephalic ([facility])     Nursing home resident     mathieu ariasy    Osteoporosis

## 2024-12-17 VITALS
DIASTOLIC BLOOD PRESSURE: 77 MMHG | SYSTOLIC BLOOD PRESSURE: 119 MMHG | HEIGHT: 55 IN | RESPIRATION RATE: 16 BRPM | TEMPERATURE: 97.5 F | HEART RATE: 75 BPM | BODY MASS INDEX: 18.28 KG/M2 | WEIGHT: 79 LBS | OXYGEN SATURATION: 99 %

## 2024-12-17 PROCEDURE — 2580000003 HC RX 258: Performed by: FAMILY MEDICINE

## 2024-12-17 PROCEDURE — 6360000002 HC RX W HCPCS: Performed by: FAMILY MEDICINE

## 2024-12-17 RX ADMIN — AZITHROMYCIN MONOHYDRATE 500 MG: 500 INJECTION, POWDER, LYOPHILIZED, FOR SOLUTION INTRAVENOUS at 08:50

## 2024-12-17 RX ADMIN — ENOXAPARIN SODIUM 30 MG: 100 INJECTION SUBCUTANEOUS at 08:44

## 2024-12-17 RX ADMIN — WATER 1000 MG: 1 INJECTION INTRAMUSCULAR; INTRAVENOUS; SUBCUTANEOUS at 08:45

## 2024-12-17 RX ADMIN — SODIUM CHLORIDE, PRESERVATIVE FREE 10 ML: 5 INJECTION INTRAVENOUS at 08:44

## 2024-12-17 NOTE — PLAN OF CARE
Initiate Spiritual Care, Psychosocial Clinical Specialist, consult as needed  Outcome: Progressing     Problem: Spiritual Care  Goal: Pt/Family able to move forward in process of forgiving self, others, and/or higher power  Description: INTERVENTIONS:  1. Assist patient/family to overcome blocks to healing by use of spiritual practices (prayer, meditation, guided imagery, reiki, breath work, etc).  2. De-myth guilt and help patient/family identify possible irrational spiritual/cultural beliefs and values.  3. Explore possibilities of making amends & reconciliation with self, others, and/or a greater power.  4. Guide patient/family in identifying painful feelings of guilt.  5. Help patient/famiy explore and identify spiritual beliefs, cultural understandings or values that may help or hinder letting go of issue.  6. Help patient/family explore feelings of anger, bitterness, resentment.  7. Help patient/family identify and examine the situation in which these feelings are experienced.  8. Help patient/family identify destructive displacement of feelings onto other individuals.  9. Invite use of sacraments/rituals/ceremonies as appropriate (e.g. - confession, anointing, smudging).  10. Refer patient/family to formal counseling and/or to ade community for further support work.  Outcome: Progressing

## 2024-12-17 NOTE — CARE COORDINATION
[facility] coming to evaluate the patient at 1430.  
CM called and spoke with patient's mother, Stephani, and she verbalizes being agreeable with patient returning to [facility] with [facility]. Transportation scheduled for 1230.    RN to call report to [phone]    Discharge Report    [facility]  Clinical Case Management Department  Written by: Nancy Winston RN    Patient Name: Gabbie Petty  Attending Provider: Falguni Lorenzo MD  Admit Date: 2024  6:50 AM  MRN: [id number]  Account: [id number]                     : 1965  Discharge Date: 24      Disposition: SNF- [facility]    
Mobility  Current functional level: Mobility, Shopping, Housework, Cooking, Feeding, Toileting, Dressing, Bathing, Assistance with the following:    PT AM-PAC:   /24  OT AM-PAC:   /24    Family can provide assistance at DC: No  Would you like Case Management to discuss the discharge plan with any other family members/significant others, and if so, who? Yes  Plans to Return to Present Housing: Yes  Other Identified Issues/Barriers to RETURNING to current housing: none with hospice [facility] to follow  Potential Assistance needed at discharge: Transportation, Extended Care Facility ([facility] to supply transportation back to [facility] to follow on discharge)            Potential DME:    Patient expects to discharge to: Long-term care ([facility])  Plan for transportation at discharge: Other (see comment) ([facility])    Financial    Payor: MEDICARE / Plan: MEDICARE PART A AND B / Product Type: *No Product type* /     Does insurance require precert for SNF: No    Potential assistance Purchasing Medications: No  Meds-to-Beds request:      No Pharmacies Listed    Notes:    Factors facilitating achievement of predicted outcomes: Caregiver support and Has needed Durable Medical Equipment at home    Barriers to discharge: Limited family support, Cognitive deficit, Limited safety awareness, Limited participation, Limited insight into deficits, Communication deficit, Decreased endurance, Long standing deficits, Medical complications, and Medication managment    Additional Case Management Notes: [facility] had been in to evaluate the patient. It has been arranged for the patient to go back to [facility] with Hospice to follow.  [facility] will transport at 1030 tomorrow morning. I attempted to speak with family  without result, message left.  Informed [facility] pick her at 1030 tomorrow morning, call report to [phone].    The Plan for Transition of Care is related to the following treatment goals of Hypoxemia

## 2024-12-18 ENCOUNTER — TELEPHONE (OUTPATIENT)
Age: 59
End: 2024-12-18

## 2024-12-18 NOTE — TELEPHONE ENCOUNTER
[place] from [facility] called to cancel patients surgery (perc neph) for 12/31.  Patient is under hospice care.

## 2024-12-21 LAB
MICROORGANISM SPEC CULT: NORMAL
MICROORGANISM SPEC CULT: NORMAL
SERVICE CMNT-IMP: NORMAL
SERVICE CMNT-IMP: NORMAL
SPECIMEN DESCRIPTION: NORMAL
SPECIMEN DESCRIPTION: NORMAL